# Patient Record
Sex: FEMALE | Race: BLACK OR AFRICAN AMERICAN | NOT HISPANIC OR LATINO | Employment: UNEMPLOYED | URBAN - METROPOLITAN AREA
[De-identification: names, ages, dates, MRNs, and addresses within clinical notes are randomized per-mention and may not be internally consistent; named-entity substitution may affect disease eponyms.]

---

## 2023-04-25 LAB — GLUCOSE, 1 HR SCREEN, PREG: 67 MG/DL

## 2023-05-19 LAB
ERYTHROCYTE DISTRIBUTION WIDTH (RATIO) BY AUTOMATED COUNT: 13 % (ref 11.5–14.5)
ERYTHROCYTE MEAN CORPUSCULAR HEMOGLOBIN CONCENTRATION (G/DL) BY AUTOMATED: 30.4 G/DL (ref 32–36)
ERYTHROCYTE MEAN CORPUSCULAR VOLUME (FL) BY AUTOMATED COUNT: 89 FL (ref 80–100)
ERYTHROCYTES (10*6/UL) IN BLOOD BY AUTOMATED COUNT: 3.42 X10E12/L (ref 4–5.2)
FERRITIN, PREGNANCY: 17 UG/L
FOLATE, SERUM, PREGNANCY: 14.7 NG/ML
HEMATOCRIT (%) IN BLOOD BY AUTOMATED COUNT: 30.3 % (ref 36–46)
HEMOGLOBIN (G/DL) IN BLOOD: 9.2 G/DL (ref 12–16)
IRON (UG/DL) IN SER/PLAS IN PREGNANCY: 35 UG/DL
IRON BINDING CAPACITY (UG/DL) IN PREGNANCY: >485 UG/DL
IRON SATURATION (%) IN PREGNANCY: ABNORMAL %
LEUKOCYTES (10*3/UL) IN BLOOD BY AUTOMATED COUNT: 7.4 X10E9/L (ref 4.4–11.3)
NRBC (PER 100 WBCS) BY AUTOMATED COUNT: 0 /100 WBC (ref 0–0)
PLATELETS (10*3/UL) IN BLOOD AUTOMATED COUNT: 233 X10E9/L (ref 150–450)
REFLEX ADDED, ANEMIA PANEL: ABNORMAL
SYPHILIS TOTAL AB: NONREACTIVE
VITAMIN B12, PREGNANCY: 256 PG/ML

## 2023-05-20 ENCOUNTER — HOSPITAL ENCOUNTER (OUTPATIENT)
Dept: DATA CONVERSION | Facility: HOSPITAL | Age: 33
End: 2023-05-20
Attending: OBSTETRICS & GYNECOLOGY

## 2023-05-20 DIAGNOSIS — O99.012 ANEMIA COMPLICATING PREGNANCY, SECOND TRIMESTER (HHS-HCC): ICD-10-CM

## 2023-05-20 DIAGNOSIS — Z87.891 PERSONAL HISTORY OF NICOTINE DEPENDENCE: ICD-10-CM

## 2023-05-20 DIAGNOSIS — D64.9 ANEMIA, UNSPECIFIED: ICD-10-CM

## 2023-05-20 DIAGNOSIS — Z79.899 OTHER LONG TERM (CURRENT) DRUG THERAPY: ICD-10-CM

## 2023-05-20 DIAGNOSIS — Z3A.27 27 WEEKS GESTATION OF PREGNANCY (HHS-HCC): ICD-10-CM

## 2023-05-21 LAB — URINE CULTURE: NORMAL

## 2023-06-30 LAB
BASOPHILS (10*3/UL) IN BLOOD BY AUTOMATED COUNT: 0.02 X10E9/L (ref 0–0.1)
BASOPHILS/100 LEUKOCYTES IN BLOOD BY AUTOMATED COUNT: 0.3 % (ref 0–2)
EOSINOPHILS (10*3/UL) IN BLOOD BY AUTOMATED COUNT: 0.05 X10E9/L (ref 0–0.7)
EOSINOPHILS/100 LEUKOCYTES IN BLOOD BY AUTOMATED COUNT: 0.8 % (ref 0–6)
ERYTHROCYTE DISTRIBUTION WIDTH (RATIO) BY AUTOMATED COUNT: 13.5 % (ref 11.5–14.5)
ERYTHROCYTE DISTRIBUTION WIDTH (RATIO) BY AUTOMATED COUNT: 13.5 % (ref 11.5–14.5)
ERYTHROCYTE MEAN CORPUSCULAR HEMOGLOBIN CONCENTRATION (G/DL) BY AUTOMATED: 31 G/DL (ref 32–36)
ERYTHROCYTE MEAN CORPUSCULAR HEMOGLOBIN CONCENTRATION (G/DL) BY AUTOMATED: 31 G/DL (ref 32–36)
ERYTHROCYTE MEAN CORPUSCULAR VOLUME (FL) BY AUTOMATED COUNT: 85 FL (ref 80–100)
ERYTHROCYTE MEAN CORPUSCULAR VOLUME (FL) BY AUTOMATED COUNT: 85 FL (ref 80–100)
ERYTHROCYTES (10*6/UL) IN BLOOD BY AUTOMATED COUNT: 3.56 X10E12/L (ref 4–5.2)
ERYTHROCYTES (10*6/UL) IN BLOOD BY AUTOMATED COUNT: 3.56 X10E12/L (ref 4–5.2)
HEMATOCRIT (%) IN BLOOD BY AUTOMATED COUNT: 30.3 % (ref 36–46)
HEMATOCRIT (%) IN BLOOD BY AUTOMATED COUNT: 30.3 % (ref 36–46)
HEMOGLOBIN (G/DL) IN BLOOD: 9.4 G/DL (ref 12–16)
HEMOGLOBIN (G/DL) IN BLOOD: 9.4 G/DL (ref 12–16)
IMMATURE GRANULOCYTES/100 LEUKOCYTES IN BLOOD BY AUTOMATED COUNT: 2.5 % (ref 0–0.9)
IRON (UG/DL) IN SER/PLAS IN PREGNANCY: 30 UG/DL
IRON BINDING CAPACITY (UG/DL) IN PREGNANCY: >480 UG/DL
IRON SATURATION (%) IN PREGNANCY: ABNORMAL %
LEUKOCYTES (10*3/UL) IN BLOOD BY AUTOMATED COUNT: 6.1 X10E9/L (ref 4.4–11.3)
LEUKOCYTES (10*3/UL) IN BLOOD BY AUTOMATED COUNT: 6.1 X10E9/L (ref 4.4–11.3)
LYMPHOCYTES (10*3/UL) IN BLOOD BY AUTOMATED COUNT: 1.22 X10E9/L (ref 1.2–4.8)
LYMPHOCYTES/100 LEUKOCYTES IN BLOOD BY AUTOMATED COUNT: 20 % (ref 13–44)
MONOCYTES (10*3/UL) IN BLOOD BY AUTOMATED COUNT: 0.49 X10E9/L (ref 0.1–1)
MONOCYTES/100 LEUKOCYTES IN BLOOD BY AUTOMATED COUNT: 8 % (ref 2–10)
NEUTROPHILS (10*3/UL) IN BLOOD BY AUTOMATED COUNT: 4.18 X10E9/L (ref 1.2–7.7)
NEUTROPHILS/100 LEUKOCYTES IN BLOOD BY AUTOMATED COUNT: 68.4 % (ref 40–80)
NRBC (PER 100 WBCS) BY AUTOMATED COUNT: 0 /100 WBC (ref 0–0)
NRBC (PER 100 WBCS) BY AUTOMATED COUNT: 0 /100 WBC (ref 0–0)
PLATELETS (10*3/UL) IN BLOOD AUTOMATED COUNT: 240 X10E9/L (ref 150–450)
PLATELETS (10*3/UL) IN BLOOD AUTOMATED COUNT: 240 X10E9/L (ref 150–450)
REFLEX ADDED, ANEMIA PANEL: ABNORMAL

## 2023-07-01 LAB
FERRITIN (UG/LL) IN SER/PLAS: 20 UG/L (ref 8–150)
FERRITIN, PREGNANCY: NORMAL
FOLATE, SERUM, PREGNANCY: 20.4 NG/ML
VITAMIN B12, PREGNANCY: 250 PG/ML

## 2023-07-21 ENCOUNTER — HOSPITAL ENCOUNTER (OUTPATIENT)
Dept: DATA CONVERSION | Facility: HOSPITAL | Age: 33
End: 2023-07-21
Attending: OBSTETRICS & GYNECOLOGY

## 2023-07-21 DIAGNOSIS — O47.03 FALSE LABOR BEFORE 37 COMPLETED WEEKS OF GESTATION, THIRD TRIMESTER (HHS-HCC): ICD-10-CM

## 2023-07-21 DIAGNOSIS — Z3A.36 36 WEEKS GESTATION OF PREGNANCY (HHS-HCC): ICD-10-CM

## 2023-07-21 DIAGNOSIS — D64.9 ANEMIA, UNSPECIFIED: ICD-10-CM

## 2023-07-21 DIAGNOSIS — Z34.80 ENCOUNTER FOR SUPERVISION OF OTHER NORMAL PREGNANCY, UNSPECIFIED TRIMESTER (HHS-HCC): ICD-10-CM

## 2023-07-21 DIAGNOSIS — O99.013 ANEMIA COMPLICATING PREGNANCY, THIRD TRIMESTER (HHS-HCC): ICD-10-CM

## 2023-07-22 LAB — GROUP B STREP SCREEN: ABNORMAL

## 2023-07-26 ENCOUNTER — HOSPITAL ENCOUNTER (OUTPATIENT)
Dept: DATA CONVERSION | Facility: HOSPITAL | Age: 33
End: 2023-07-26
Attending: OBSTETRICS & GYNECOLOGY | Admitting: OBSTETRICS & GYNECOLOGY

## 2023-07-26 DIAGNOSIS — Z34.80 ENCOUNTER FOR SUPERVISION OF OTHER NORMAL PREGNANCY, UNSPECIFIED TRIMESTER (HHS-HCC): ICD-10-CM

## 2023-07-26 DIAGNOSIS — Z3A.37 37 WEEKS GESTATION OF PREGNANCY (HHS-HCC): ICD-10-CM

## 2023-07-26 DIAGNOSIS — D64.9 ANEMIA, UNSPECIFIED: ICD-10-CM

## 2023-07-26 DIAGNOSIS — O99.013 ANEMIA COMPLICATING PREGNANCY, THIRD TRIMESTER (HHS-HCC): ICD-10-CM

## 2023-07-28 ENCOUNTER — HOSPITAL ENCOUNTER (OUTPATIENT)
Dept: DATA CONVERSION | Facility: HOSPITAL | Age: 33
End: 2023-07-28
Attending: OBSTETRICS & GYNECOLOGY | Admitting: OBSTETRICS & GYNECOLOGY

## 2023-07-28 DIAGNOSIS — O99.013 ANEMIA COMPLICATING PREGNANCY, THIRD TRIMESTER (HHS-HCC): ICD-10-CM

## 2023-07-28 DIAGNOSIS — Z3A.37 37 WEEKS GESTATION OF PREGNANCY (HHS-HCC): ICD-10-CM

## 2023-07-28 DIAGNOSIS — D64.9 ANEMIA, UNSPECIFIED: ICD-10-CM

## 2023-09-07 VITALS — WEIGHT: 119.49 LBS

## 2023-09-29 VITALS — WEIGHT: 121.03 LBS | HEIGHT: 63 IN | BODY MASS INDEX: 21.45 KG/M2

## 2023-09-30 NOTE — PROGRESS NOTES
Current Stage:   Stage: Triage     OB Dating:   EDC/EGA:  ·  Final CONNIE 13-Aug-2023   ·  EGA 36.5     Subjective Data:   Antepartum:  Vaginal Bleeding: No   Contractions/Abdominal Pain: Yes   Discharge/Loss of Fluid: No   Fetal Movement: Good   Fevers/Chills: No   Preeclampsia Symptoms: No   Antepartum:     pregnancy complicated by anemia scheduled for Venofer infusions - presents for complaints of contractions since . All babies delivered near term. No vaginal  bleeding or rupture of membranes.       Objective Information:    Objective Information:      T   P  R  BP   MAP  SpO2   Value  36.2  77  16  123/74   93  99%  Date/Time  22:07  23:13  22:07  23:13   23:13  22:11  Range  (36.2C - 36.2C )  (77 - 103 )  (16 - 16 )  (106 - 123 )/ (57 - 74 )  (75 - 93 )  (98% - 99% )      Pain reported at  22:07: 0 = None      T   P  R  BP   MAP  SpO2   Value  36.2  77  16  123/74   93  99%  Date/Time  22:07  23:13  22:07  23:13   23:13  22:11  Range  (36.2C - 36.2C )  (77 - 103 )  (16 - 16 )  (106 - 123 )/ (57 - 74 )  (75 - 93 )  (98% - 99% )      Physical Exam:   Constitutional: alert, oriented   Obstetric: External Genitalia - normal, no lesions    Cervix 1/50/-2   Respiratory/Thorax: normal rate and effort   Cardiovascular: normal rate   Gastrointestinal: soft, NT   Genitourinary: no CVAT, no suprapubic tenderness   Extremities: no calf tenderness, reflexes 2+   Psychological: appropriate affect   Skin: no rashes or lesions     Assessment and Plan:     Impression 1: False Labor less than 37 weeks   Plan for Impression 1: No signs of active labor  Patient lives close to hospital  Will discharge home labor precautions given.       Electronic Signatures:  Jay Cannon)  (Signed 2023 23:38)   Authored: Current Stage, OB Dating, Subjective Data,  Objective Data, Assessment and Plan, Note Completion      Last Updated: 2023 23:38 by Jay Cannon  (MD)

## 2023-09-30 NOTE — PROGRESS NOTES
Current Stage:   Stage: Triage     Subjective Data:   Antepartum:  Antepartum:    33yo  at 27.6wga by 13wk u/s who presents for ctx. Reports being seen in the office  AM, had intermittent ctx at that time, SVE 50/-3. Went home and developed more  regular ctx, so presented to triage for additional evaluation. Denies VB, LOF. +FM.    Pregnancy notable for:   - Anemia, hgb 9.2 with ferritin 17 on , for PO iron    OBHx:   - 2015  at 39wks 6#4oz M  - 2016  at 39wks, 5#13oz M  - 2018  at 39wks, 6#15oz F  GYNHx: Denies h/o STI; last pap 2023 NILM, HRHPV+ (other genotype)  PMHx: Denies  PSHx: Denies  SHx: Denies tob/etoh/recreational drug use  FHx: Reviewed and not contributory  Allergies: NKDA        Objective Information:    Objective Information:        T   P  R  BP   MAP  SpO2   Value  36.4  99  14  109/60   77  98%  Date/Time  1:42  1:42  1:42  1:42   1:42  1:42  Range  (36.4C - 36.4C )  (99 - 99 )  (14 - 14 )  (109 - 109 )/ (60 - 60 )  (77 - 77 )  (98% - 98% )      Physical Exam:   Constitutional: NAD   Obstetric: FHT: baseline 120, moderate variability,  +accels, -decels  TOCO: quiet  SVE: 50/-3   Eyes: EOMI, sclerae clear   Head/Neck: NCAT   Respiratory/Thorax: Normal inspiratory effort   Cardiovascular: Warm and well perfused   Gastrointestinal: Abdomen gravid, nontender   Musculoskeletal: Full ROM   Extremities: No LE edema, tenderness   Neurological: No gross deficits   Psychological: Appropriate mood and affect   Skin: No rashes or lesions      Testing:   NST Interpretation  - Baby B:  ·  Baseline    ·  Variability moderate (amplitude range 6 to 25 bpm)   ·  Interpretation Appropriate for EGA (2 10x10 accels)   ·  Accelerations +   ·  Decelerations -     Assessment and Plan:   Assessment:    33yo  at 27.6wga by 13wk u/s who presents for ctx.    Ctx  - SVE unchanged from exam in office on , no contractions on toco; no  e/o PTL at this time  - SG >1.030 on udip, plan for oral rehydration in triage  - Udip also with trace leukocytes, but patient denies any urinary sx, will send UCx to r/o asx bacteriuria   - Labor precautions reviewed, discussed need to return to labor and delivery if patient has continued, stronger, regular contractions, leakage of fluid, vaginal bleeding, or decreased fetal movement    Fetal Well Being  - NST AGA    Anemia  - Hgb 9.2, ferritin 17 from office today, asx; sent PO iron and colace to pharmacy    Discussed with Dr. Mercado.    Dispo: Discharge home with outpatient follow up with OB provider     Radha Hurtado MD  OBGYN PGY-4  [employer]    Attestation:   Note Completion:  I am a:  Resident/Fellow   Attending Attestation I saw and evaluated the patient.  I personally obtained the key and critical portions of the history and physical exam or was physically present for key and  critical portions performed by the resident/fellow. I reviewed the resident/fellow?s documentation and discussed the patient with the resident/fellow.  I agree with the resident/fellow?s medical decision making as documented in the note.     I personally evaluated the patient on 20-May-2023         Electronic Signatures:  Sylvia Hurtado (MD (Resident))  (Signed 20-May-2023 02:15)   Authored: Current Stage, Subjective Data, Objective Data,   Testing, Assessment and Plan, Note Completion  Faith Mercado (MD (Fellow))  (Signed 20-May-2023 04:58)   Authored: Note Completion   Co-Signer: Current Stage, Subjective Data, Objective Data,  Testing, Assessment and Plan, Note Completion      Last Updated: 20-May-2023 04:58 by Faith Mercado (MD (Fellow))

## 2023-09-30 NOTE — H&P
HPI/OB History:   Prenatal Care Provider: [facility]     HPI Descriptive Info:  ·  HPI    33yo  at 37.3wga by 13wk u/s who presents for IV Iron Sucrose    Pregnancy notable for:   - Anemia, hgb 9.4     OBHx:   - 2015  at 39wks 6#4oz M  - 2016  at 39wks, 5#13oz M  - 2018  at 39wks, 6#15oz F  GYNHx: Denies h/o STI; last pap 2023 NILM, HRHPV+ (other genotype)  PMHx: Denies  PSHx: Denies  SHx: Denies tob/etoh/recreational drug use  FHx: Reviewed and not contributory  Allergies: NKDA    Prenatal Labs:   Prenatal Labs:    Prenatal Labs:   Blood Typed Date: 2023   Blood Type: O positive   Antibody Screen Results: negative   Chlamydia Date: 2023   Chlamydia Results: negative   Gonorrhea Date: 2023   Gonorrhea Results: negative   Group B Strep Date: 2023   Strep Results: positive   Group B Strep Comments: Group B streptococcus  ISOLATED   GCT (dd-mmm-yy): 2023   GCT result: 67   HBsAG Date: 2023   HBsAG Results: negative   HIV Date: 2023   HIV Results: negative   Rubella Date: 2023   Rubella Results: nonimmune   Rubella Comments: Result Value Negative   Syphilis (mmm-dd-yyyy): 19-May-2023   Syphilis Results: negative     Antepartum/Postpartum:   Antepartum/PP:  Final CONNIE 13-Aug-2023   Current EGA: 37.3   Patient is > or = 35.0 wks EGA yes   Determined by [facility]   EFW (kg) 2.722 kilogram(s)   EFW (lb) 6 pound(s)   EFW (oz) 0 ounce(s)   Fetal Presentation not applicable   Fetal presentation verified by not applicable   Vaginal Bleeding No   Contractions/Abdominal Pain No   Discharge/Loss of Fluid No   Fetal Movement Good   Hemorrhage Low Risk Factors (T&S) patient with no medium or high risk factors   Hemorrhage Risk Assessment hemorrhage risk completed on admission   Hemorrhage Risk Score Patient is at Low Risk for an OB hemorrhage. Order Type & Screen.   TOLAC no   Did this patient receive progesterone in any form to prevent  premature delivery? no   Does patient desire postplacental IUD? no              Allergies:  ·  No Known Allergies :     Medications Prior to Admission:   Admission Medication Reconciliation has not been completed for this patient.    Review of Systems:   All Other Systems: All other systems reviewed and  are negative     Objective:     Objective Information:      T   P  R  BP   MAP  SpO2   Value  36.8  99  18  102/68   79  98%  Date/Time  12:17  12:17  12:17  12:17   12:17  12:17  Range  (36.4C - 36.8C )  (76 - 99 )  (18 - 18 )  (102 - 122 )/ (68 - 76 )  (79 - 92 )  (98% - 100% )      Pain reported at  12:17: 0 = None    Physical Exam by System:    Constitutional: alert, oriented   Obstetric: nontender, gravid   Eyes: pupils equal, sclerae clear   Respiratory/Thorax: normal respiratory effort   Musculoskeletal: normal ROM   Psychological: appropriate affect   Skin: no rashes or lesions     Recent Lab Results:    Results:    CBC: 2023 14:13              \     Hgb     /                              \     9.4 L    /  WBC  ----------------  Plt               6.1       ----------------    240              /     Hct     \                              /     30.3 L    \            RBC: 3.56 L    MCV: 85     Neutrophil %: 68.4    Assessment and Plan:   Assessment:    31yo  at 37.3wga by 13wk u/s who presents for IV Iron Sucrose    IV iron infusion  - Labs  showed Hgb 9.4, ferritin 20  - Plan for IV iron sucrose 300  - Will monitor for si/sy of hypersensitivity reaction and treat as indicated.  - FHR on admission otherwise no fetal monitoring indicated.   - Will DC home after iron infusion complete. Plan for follow up with OB provider as indicated.    Shell Guerra MD, PGY-3 OBGYN  Discussed with Dr. contreras      Attestation:   Note Completion:  I am a:  Resident/Fellow   Attending Attestation I saw and evaluated the patient.  I personally obtained the key and critical portions of the  history and physical exam or was physically present for key and  critical portions performed by the resident/fellow. I reviewed the resident/fellow?s documentation and discussed the patient with the resident/fellow.  I agree with the resident/fellow?s medical decision making as documented in the note.     I personally evaluated the patient on 26-Jul-2023         Electronic Signatures:  Javi Covarrubias)  (Signed 26-Jul-2023 22:03)   Authored: Note Completion   Co-Signer: HPI/OB History, Antepartum/PP, Allergies, Medications Prior to Admission, Review of Systems, Objective, Assessment and Plan,  Note Completion  Shell Guerra (Resident))  (Signed 26-Jul-2023 14:43)   Authored: HPI/OB History, Prenatal Labs, Antepartum/PP,  Allergies, Medications Prior to Admission, Review of Systems, Objective, Assessment and Plan, Note Completion      Last Updated: 26-Jul-2023 22:03 by Javi Covarrubias)

## 2023-09-30 NOTE — H&P
HPI/OB History:   Prenatal Care Provider: [facility]     HPI Descriptive Info:  ·  HPI    31yo  at 37.5wga by 13wk u/s who presents for IV Iron Sucrose    Pregnancy notable for:   - Anemia, hgb 9.4     OBHx:   - 2015  at 39wks 6#4oz M  - 2016  at 39wks, 5#13oz M  - 2018  at 39wks, 6#15oz F  GYNHx: Denies h/o STI; last pap 2023 NILM, HRHPV+ (other genotype)  PMHx: Denies  PSHx: Denies  SHx: Denies tob/etoh/recreational drug use  FHx: Reviewed and not contributory  Allergies: NKDA        Prenatal Labs:   Prenatal Labs:    Prenatal Labs:   Blood Typed Date: 2023   Blood Type: O positive   Antibody Screen Results: negative   Chlamydia Date: 2023   Chlamydia Results: negative   Gonorrhea Date: 2023   Gonorrhea Results: negative   Group B Strep Date: 2023   Strep Results: positive   Group B Strep Comments: Group B streptococcus  ISOLATED   GCT (dd-mmm-yy): 2023   GCT result: 67   HBsAG Date: 2023   HBsAG Results: negative   HIV Date: 2023   HIV Results: negative   Rubella Date: 2023   Rubella Results: nonimmune   Rubella Comments: Result Value Negative   Syphilis (mmm-dd-yyyy): 19-May-2023   Syphilis Results: negative     Antepartum/Postpartum:   Antepartum/PP:  Final CONNIE 13-Aug-2023   Current EGA: 37.5   Patient is > or = 35.0 wks EGA yes   Determined by ultrasound   Date of Ultrasound 2023   EFW (kg) 2.4 kilogram(s)   EFW (lb) 5 pound(s)   EFW (oz) 5 ounce(s)   Fetal Presentation not applicable   Fetal presentation verified by not applicable   Vaginal Bleeding No   Contractions/Abdominal Pain No   Discharge/Loss of Fluid No   Fetal Movement Good   Hemorrhage Low Risk Factors (T&S) patient with no medium or high risk factors   Hemorrhage Risk Assessment hemorrhage risk completed on admission   Hemorrhage Risk Score Patient is at Low Risk for an OB hemorrhage. Order Type & Screen.   TOLAC no   Did this patient receive  progesterone in any form to prevent premature delivery? no   Does patient desire postplacental IUD? no              Allergies:  ·  No Known Allergies :     Medications Prior to Admission:   Admission Medication Reconciliation has not been completed for this patient.    Review of Systems:   All Other Systems: All other systems reviewed and  are negative     Objective:     Objective Information:      T   P  R  BP   MAP  SpO2   Value  36.9  80  18  107/66   80  98%  Date/Time  9:29  9:29  9:29  9:29   9: 9:29  Range  (36.9C - 36.9C )  (80 - 80 )  (18 - 18 )  (107 - 107 )/ (66 - 66 )  (80 - 80 )  (98% - 98% )  Highest temp of 36.9 C was recorded at  9:29      Pain reported at  11:00: 0 = None    Physical Exam by System:    Constitutional: Alert, awake, no acute distress   Obstetric: Soft, gravid   Eyes: Anicteric   ENMT: MMM   Head/Neck: Atraumatic   Respiratory/Thorax: Breathing comfortably on room  air   Cardiovascular: Warm and well perfused   Gastrointestinal: Gravid   Extremities: Moving all extremities spontaneously   Neurological: A&Ox3, no gross motor or sensory deficits  noted   Psychological: Mood and affect appropriate   Skin: No rashes or lesions     Recent Lab Results:    Results:    CBC: 2023 14:13              \     Hgb     /                              \     9.4 L    /  WBC  ----------------  Plt               6.1       ----------------    240              /     Hct     \                              /     30.3 L    \            RBC: 3.56 L    MCV: 85     Neutrophil %: 68.4    Assessment and Plan:   Assessment:    33yo  at 37.5wga by 13wk u/s who presents for IV Iron Sucrose #2    IV iron infusion  - Labs  showed Hgb 9.4, ferritin 20  - Plan for IV iron sucrose #2  - Will monitor for si/sy of hypersensitivity reaction and treat as indicated.  - FHR on admission otherwise no fetal monitoring indicated.   - Will DC home after iron infusion complete. Plan  for follow up with OB provider as indicated.    Seen and d/w Dr. Satish Parsons MD, PGY2         Attestation:   Note Completion:  I am a:  Resident/Fellow   Attending Attestation I reviewed the resident/fellow?s documentation and discussed the patient with the resident/fellow.  I agree with the resident/fellow?s medical  decision making as documented in the note.    Comments/ Additional Findings    Patient left prior to being seen          Electronic Signatures:  Monik Parsons (MD (Resident))  (Signed 28-Jul-2023 12:08)   Authored: HPI/OB History, Prenatal Labs, Antepartum/PP,  Allergies, Medications Prior to Admission, Review of Systems, Objective, Assessment and Plan, Note Completion  Javi Covarrubias)  (Signed 28-Jul-2023 18:03)   Authored: Note Completion   Co-Signer: Antepartum/PP, Review of Systems, Objective, Assessment and Plan, Note Completion      Last Updated: 28-Jul-2023 18:03 by Javi Covarrubias)

## 2023-10-06 PROBLEM — Z34.90 PREGNANCY (HHS-HCC): Status: ACTIVE | Noted: 2023-10-06

## 2023-10-06 PROBLEM — O36.5990 IUGR, ANTENATAL (HHS-HCC): Status: ACTIVE | Noted: 2023-10-06

## 2023-10-06 PROBLEM — O99.019 ANEMIA IN PREGNANCY (HHS-HCC): Status: ACTIVE | Noted: 2023-10-06

## 2023-10-06 PROBLEM — O09.90 HIGH-RISK PREGNANCY (HHS-HCC): Status: ACTIVE | Noted: 2023-10-06

## 2023-10-06 RX ORDER — DOXYLAMINE SUCCINATE AND PYRIDOXINE HYDROCHLORIDE 10; 10 MG/1; MG/1
TABLET, DELAYED RELEASE ORAL
COMMUNITY
Start: 2023-01-06

## 2023-10-06 RX ORDER — PRENATAL WITH FERROUS FUM AND FOLIC ACID 3080; 920; 120; 400; 22; 1.84; 3; 20; 10; 1; 12; 200; 27; 25; 2 [IU]/1; [IU]/1; MG/1; [IU]/1; MG/1; MG/1; MG/1; MG/1; MG/1; MG/1; UG/1; MG/1; MG/1; MG/1; MG/1
TABLET ORAL
COMMUNITY
Start: 2023-01-06

## 2023-10-06 RX ORDER — NITROFURANTOIN 25; 75 MG/1; MG/1
100 CAPSULE ORAL
COMMUNITY
Start: 2023-01-06 | End: 2023-10-09

## 2023-10-09 ENCOUNTER — OFFICE VISIT (OUTPATIENT)
Dept: OBSTETRICS AND GYNECOLOGY | Facility: CLINIC | Age: 33
End: 2023-10-09
Payer: MEDICAID

## 2023-10-09 VITALS
WEIGHT: 107 LBS | DIASTOLIC BLOOD PRESSURE: 97 MMHG | BODY MASS INDEX: 19.01 KG/M2 | SYSTOLIC BLOOD PRESSURE: 153 MMHG | HEART RATE: 94 BPM

## 2023-10-09 DIAGNOSIS — Z11.3 ROUTINE SCREENING FOR STI (SEXUALLY TRANSMITTED INFECTION): ICD-10-CM

## 2023-10-09 DIAGNOSIS — R87.618 PAP SMEAR ABNORMALITY OF CERVIX/HUMAN PAPILLOMAVIRUS (HPV) POSITIVE: ICD-10-CM

## 2023-10-09 DIAGNOSIS — Z01.419 GYNECOLOGIC EXAM NORMAL: ICD-10-CM

## 2023-10-09 DIAGNOSIS — Z01.419 WOMEN'S ANNUAL ROUTINE GYNECOLOGICAL EXAMINATION: Primary | ICD-10-CM

## 2023-10-09 DIAGNOSIS — R03.0 ELEVATED BP WITHOUT DIAGNOSIS OF HYPERTENSION: ICD-10-CM

## 2023-10-09 PROBLEM — O99.019 ANEMIA IN PREGNANCY (HHS-HCC): Status: RESOLVED | Noted: 2023-10-06 | Resolved: 2023-10-09

## 2023-10-09 PROBLEM — Z34.90 PREGNANCY (HHS-HCC): Status: RESOLVED | Noted: 2023-10-06 | Resolved: 2023-10-09

## 2023-10-09 PROBLEM — O36.5990 IUGR, ANTENATAL (HHS-HCC): Status: RESOLVED | Noted: 2023-10-06 | Resolved: 2023-10-09

## 2023-10-09 PROBLEM — O09.90 HIGH-RISK PREGNANCY (HHS-HCC): Status: RESOLVED | Noted: 2023-10-06 | Resolved: 2023-10-09

## 2023-10-09 PROCEDURE — 99395 PREV VISIT EST AGE 18-39: CPT | Performed by: ADVANCED PRACTICE MIDWIFE

## 2023-10-09 PROCEDURE — 1036F TOBACCO NON-USER: CPT | Performed by: ADVANCED PRACTICE MIDWIFE

## 2023-10-09 ASSESSMENT — ENCOUNTER SYMPTOMS: CONSTITUTIONAL NEGATIVE: 1

## 2023-10-09 ASSESSMENT — PAIN SCALES - GENERAL: PAINLEVEL: 0-NO PAIN

## 2023-10-09 NOTE — PROGRESS NOTES
Subjective   Betty Sommers is a 33 y.o. female who is here for a routine exam.   Concerns: Patient is currently still bleeding s/p  2 months ago and Depo. Patient had Depo before discharge from hospital. She is unsure when her next shot is due.     Patient declines STI testing today.     No LMP recorded (lmp unknown).    Last pap: 23 WNL HPV+   History of abnormal Pap smear: yes - see above   HPV vaccination: No    Social History     Substance and Sexual Activity   Sexual Activity Not Currently    Partners: Male    Birth control/protection: Injection     Hx of STIs: none  OB History          4    Para   4    Term   4            AB        Living   4         SAB        IAB        Ectopic        Multiple        Live Births   4               Past Medical History:   Diagnosis Date    Abnormal Pap smear of cervix      History reviewed. No pertinent surgical history.  Family History   Problem Relation Name Age of Onset    Stroke Mother      Diabetes Father      Pancreatic cancer Other paternal aunt      Substance:   Tobacco Use: Low Risk  (10/9/2023)    Patient History     Smoking Tobacco Use: Never     Smokeless Tobacco Use: Never     Passive Exposure: Not on file      Social History     Substance and Sexual Activity   Drug Use Never      Social History     Substance and Sexual Activity   Alcohol Use Never     Review of Systems   Genitourinary:  Positive for vaginal bleeding.       Objective   BP (!) 153/97   Pulse 94   Wt 48.5 kg (107 lb)   LMP  (LMP Unknown) Comment: contatnt bleeding since baby was born  Breastfeeding Yes   BMI 19.01 kg/m²   Repeat 145/91    Physical Exam  Constitutional:       Appearance: Normal appearance.   Cardiovascular:      Rate and Rhythm: Normal rate.   Pulmonary:      Effort: Pulmonary effort is normal.   Skin:     General: Skin is warm and dry.   Neurological:      Mental Status: She is alert.   Psychiatric:         Mood and Affect: Mood normal.         Behavior:  Behavior normal.         Thought Content: Thought content normal.         Judgment: Judgment normal.         Assessment/Plan   Problem List Items Addressed This Visit       Pap smear abnormality of cervix/human papillomavirus (HPV) positive    Overview     2/14/23 WNL HPV neg; needs repeat 2/24         Gynecologic exam normal    Current Assessment & Plan     Declines STI testing today          Elevated BP without diagnosis of hypertension    Overview     153/97 initially   Recheck: 145/91          Other Visit Diagnoses       Women's annual routine gynecological examination    -  Primary    Routine screening for STI (sexually transmitted infection)                Plan:   -discussed about breast self awareness  -reviewed mammogram starting at 40 years old unless otherwise clinically indicated   -encouraged healthy eating, exercise recommendations based of off  min of moderate intensity exercise a week  -discussed elevated Bps  -Depo scheduled given so patient can know when next Depo shot is due   -RTC in 1  year for next annual or prn    MARGY Roche